# Patient Record
Sex: MALE | Race: WHITE | NOT HISPANIC OR LATINO | Employment: UNEMPLOYED | URBAN - METROPOLITAN AREA
[De-identification: names, ages, dates, MRNs, and addresses within clinical notes are randomized per-mention and may not be internally consistent; named-entity substitution may affect disease eponyms.]

---

## 2018-07-07 ENCOUNTER — HOSPITAL ENCOUNTER (OUTPATIENT)
Facility: MEDICAL CENTER | Age: 28
End: 2018-07-07
Attending: EMERGENCY MEDICINE | Admitting: ORTHOPAEDIC SURGERY
Payer: MEDICAID

## 2018-07-07 ENCOUNTER — APPOINTMENT (OUTPATIENT)
Dept: RADIOLOGY | Facility: MEDICAL CENTER | Age: 28
End: 2018-07-07
Attending: EMERGENCY MEDICINE
Payer: MEDICAID

## 2018-07-07 VITALS
SYSTOLIC BLOOD PRESSURE: 117 MMHG | HEART RATE: 124 BPM | TEMPERATURE: 99.3 F | HEIGHT: 69 IN | WEIGHT: 180 LBS | OXYGEN SATURATION: 94 % | DIASTOLIC BLOOD PRESSURE: 70 MMHG | RESPIRATION RATE: 16 BRPM | BODY MASS INDEX: 26.66 KG/M2

## 2018-07-07 DIAGNOSIS — S52.371A CLOSED GALEAZZI'S FRACTURE OF RIGHT RADIUS, INITIAL ENCOUNTER: ICD-10-CM

## 2018-07-07 PROCEDURE — 73090 X-RAY EXAM OF FOREARM: CPT | Mod: RT

## 2018-07-07 PROCEDURE — 29105 APPLICATION LONG ARM SPLINT: CPT

## 2018-07-07 PROCEDURE — 302875 HCHG BANDAGE ACE 4 OR 6""

## 2018-07-07 PROCEDURE — 302874 HCHG BANDAGE ACE 2 OR 3""

## 2018-07-07 PROCEDURE — 99285 EMERGENCY DEPT VISIT HI MDM: CPT

## 2018-07-07 PROCEDURE — G0378 HOSPITAL OBSERVATION PER HR: HCPCS

## 2018-07-07 RX ORDER — OXYCODONE HYDROCHLORIDE 5 MG/1
10 TABLET ORAL EVERY 4 HOURS PRN
Status: DISCONTINUED | OUTPATIENT
Start: 2018-07-07 | End: 2018-07-08 | Stop reason: HOSPADM

## 2018-07-07 RX ORDER — ACETAMINOPHEN 500 MG
1000 TABLET ORAL EVERY 6 HOURS
Status: DISCONTINUED | OUTPATIENT
Start: 2018-07-08 | End: 2018-07-08 | Stop reason: HOSPADM

## 2018-07-07 RX ORDER — OXYCODONE HYDROCHLORIDE 5 MG/1
5 TABLET ORAL EVERY 4 HOURS PRN
Status: DISCONTINUED | OUTPATIENT
Start: 2018-07-07 | End: 2018-07-08 | Stop reason: HOSPADM

## 2018-07-07 RX ORDER — MORPHINE SULFATE 4 MG/ML
4 INJECTION, SOLUTION INTRAMUSCULAR; INTRAVENOUS ONCE
Status: DISCONTINUED | OUTPATIENT
Start: 2018-07-07 | End: 2018-07-08 | Stop reason: HOSPADM

## 2018-07-07 RX ORDER — ONDANSETRON 2 MG/ML
4 INJECTION INTRAMUSCULAR; INTRAVENOUS EVERY 4 HOURS PRN
Status: DISCONTINUED | OUTPATIENT
Start: 2018-07-07 | End: 2018-07-08 | Stop reason: HOSPADM

## 2018-07-07 RX ORDER — MORPHINE SULFATE 4 MG/ML
4 INJECTION, SOLUTION INTRAMUSCULAR; INTRAVENOUS
Status: DISCONTINUED | OUTPATIENT
Start: 2018-07-07 | End: 2018-07-08 | Stop reason: HOSPADM

## 2018-07-07 ASSESSMENT — PAIN SCALES - GENERAL: PAINLEVEL_OUTOF10: 4

## 2018-07-08 ENCOUNTER — HOSPITAL ENCOUNTER (INPATIENT)
Facility: MEDICAL CENTER | Age: 28
LOS: 1 days | DRG: 563 | End: 2018-07-08
Attending: EMERGENCY MEDICINE | Admitting: INTERNAL MEDICINE
Payer: MEDICAID

## 2018-07-08 DIAGNOSIS — S52.371A CLOSED GALEAZZI'S FRACTURE OF RIGHT RADIUS, INITIAL ENCOUNTER: ICD-10-CM

## 2018-07-08 DIAGNOSIS — F15.10 METHAMPHETAMINE USE (HCC): ICD-10-CM

## 2018-07-08 LAB
AMPHET UR QL SCN: POSITIVE
ANION GAP SERPL CALC-SCNC: 10 MMOL/L (ref 0–11.9)
APTT PPP: 24.3 SEC (ref 24.7–36)
BARBITURATES UR QL SCN: NEGATIVE
BASOPHILS # BLD AUTO: 0.3 % (ref 0–1.8)
BASOPHILS # BLD: 0.03 K/UL (ref 0–0.12)
BENZODIAZ UR QL SCN: NEGATIVE
BUN SERPL-MCNC: 15 MG/DL (ref 8–22)
BZE UR QL SCN: NEGATIVE
CALCIUM SERPL-MCNC: 9.9 MG/DL (ref 8.5–10.5)
CANNABINOIDS UR QL SCN: POSITIVE
CHLORIDE SERPL-SCNC: 107 MMOL/L (ref 96–112)
CO2 SERPL-SCNC: 25 MMOL/L (ref 20–33)
CREAT SERPL-MCNC: 0.89 MG/DL (ref 0.5–1.4)
EOSINOPHIL # BLD AUTO: 0.04 K/UL (ref 0–0.51)
EOSINOPHIL NFR BLD: 0.3 % (ref 0–6.9)
ERYTHROCYTE [DISTWIDTH] IN BLOOD BY AUTOMATED COUNT: 44.6 FL (ref 35.9–50)
GLUCOSE SERPL-MCNC: 124 MG/DL (ref 65–99)
HCT VFR BLD AUTO: 44.4 % (ref 42–52)
HGB BLD-MCNC: 15.5 G/DL (ref 14–18)
IMM GRANULOCYTES # BLD AUTO: 0.03 K/UL (ref 0–0.11)
IMM GRANULOCYTES NFR BLD AUTO: 0.3 % (ref 0–0.9)
INR PPP: 1.01 (ref 0.87–1.13)
LYMPHOCYTES # BLD AUTO: 1.73 K/UL (ref 1–4.8)
LYMPHOCYTES NFR BLD: 14.6 % (ref 22–41)
MCH RBC QN AUTO: 32.5 PG (ref 27–33)
MCHC RBC AUTO-ENTMCNC: 34.9 G/DL (ref 33.7–35.3)
MCV RBC AUTO: 93.1 FL (ref 81.4–97.8)
METHADONE UR QL SCN: NEGATIVE
MONOCYTES # BLD AUTO: 0.79 K/UL (ref 0–0.85)
MONOCYTES NFR BLD AUTO: 6.7 % (ref 0–13.4)
NEUTROPHILS # BLD AUTO: 9.23 K/UL (ref 1.82–7.42)
NEUTROPHILS NFR BLD: 77.8 % (ref 44–72)
NRBC # BLD AUTO: 0 K/UL
NRBC BLD-RTO: 0 /100 WBC
OPIATES UR QL SCN: NEGATIVE
OXYCODONE UR QL SCN: NEGATIVE
PCP UR QL SCN: NEGATIVE
PLATELET # BLD AUTO: 259 K/UL (ref 164–446)
PMV BLD AUTO: 9.7 FL (ref 9–12.9)
POTASSIUM SERPL-SCNC: 3.8 MMOL/L (ref 3.6–5.5)
PROPOXYPH UR QL SCN: NEGATIVE
PROTHROMBIN TIME: 13 SEC (ref 12–14.6)
RBC # BLD AUTO: 4.77 M/UL (ref 4.7–6.1)
SODIUM SERPL-SCNC: 142 MMOL/L (ref 135–145)
WBC # BLD AUTO: 11.9 K/UL (ref 4.8–10.8)

## 2018-07-08 PROCEDURE — 99285 EMERGENCY DEPT VISIT HI MDM: CPT

## 2018-07-08 PROCEDURE — 80307 DRUG TEST PRSMV CHEM ANLYZR: CPT

## 2018-07-08 PROCEDURE — 85610 PROTHROMBIN TIME: CPT

## 2018-07-08 PROCEDURE — 80048 BASIC METABOLIC PNL TOTAL CA: CPT

## 2018-07-08 PROCEDURE — 770006 HCHG ROOM/CARE - MED/SURG/GYN SEMI*

## 2018-07-08 PROCEDURE — 85025 COMPLETE CBC W/AUTO DIFF WBC: CPT

## 2018-07-08 PROCEDURE — 85730 THROMBOPLASTIN TIME PARTIAL: CPT

## 2018-07-08 RX ORDER — POLYETHYLENE GLYCOL 3350 17 G/17G
1 POWDER, FOR SOLUTION ORAL
Status: DISCONTINUED | OUTPATIENT
Start: 2018-07-08 | End: 2018-07-09 | Stop reason: HOSPADM

## 2018-07-08 RX ORDER — AMOXICILLIN 250 MG
2 CAPSULE ORAL 2 TIMES DAILY
Status: DISCONTINUED | OUTPATIENT
Start: 2018-07-08 | End: 2018-07-09 | Stop reason: HOSPADM

## 2018-07-08 RX ORDER — SODIUM CHLORIDE 9 MG/ML
INJECTION, SOLUTION INTRAVENOUS CONTINUOUS
Status: DISCONTINUED | OUTPATIENT
Start: 2018-07-08 | End: 2018-07-09 | Stop reason: HOSPADM

## 2018-07-08 RX ORDER — ACETAMINOPHEN 325 MG/1
650 TABLET ORAL EVERY 6 HOURS PRN
Status: DISCONTINUED | OUTPATIENT
Start: 2018-07-08 | End: 2018-07-09 | Stop reason: HOSPADM

## 2018-07-08 RX ORDER — BISACODYL 10 MG
10 SUPPOSITORY, RECTAL RECTAL
Status: DISCONTINUED | OUTPATIENT
Start: 2018-07-08 | End: 2018-07-09 | Stop reason: HOSPADM

## 2018-07-08 ASSESSMENT — LIFESTYLE VARIABLES: DO YOU DRINK ALCOHOL: NO

## 2018-07-08 NOTE — PROGRESS NOTES
Right radius Galeazzi fracture.    Plan:  - Admit to observation  - Sugar tong splint in ER tonight  - To OR tomorrow for ORIF  - npo after midnight

## 2018-07-08 NOTE — ED NOTES
Pt took off down the breen out of the ER, walking briskly, not responding to name being called. Called his name firmly and eventually responded. Pt had patent IV in his arm.
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
## 2018-07-08 NOTE — DISCHARGE INSTRUCTIONS
You have a very serious break and dislocation to your right arm.  It requires surgery.  If you change your mind about admission to the hospital for surgery please feel free to return at any time    Extremity Fracture  Broken bones (fractures) take several weeks to months to heal depending on the bone involved. The broken ends must be lined up correctly and kept in position for proper healing. Do not remove the splint, immobilizer, or cast that has been applied to treat your injury. This is the most important part of your treatment. Other measures to treat fractures include:  · Keeping the injured limb at rest and elevated above your heart as recommended by your caregiver. This will help reduce pain and swelling.   · Ice packs can be applied to your fracture site for 20-30 minutes every 3-4 hours over the next 2-3 days.   · Pain medicine may be prescribed in the first days after a fracture.   SEEK IMMEDIATE MEDICAL CARE IF:  · You develop increasing pain or pressure in the injured limb, or if it becomes cold, numb, or pale.   · There is increasing pain with motion of your fingers or toes.   Document Released: 01/25/2006 Document Revised: 03/11/2013 Document Reviewed: 04/07/2010  [employer]® Patient Information ©2013 [employer].

## 2018-07-08 NOTE — ED NOTES
ERP discussed case with pt, risks & benefits. Pt still wishes to sign out AMA. Splint remains in place.

## 2018-07-09 ENCOUNTER — APPOINTMENT (OUTPATIENT)
Dept: RADIOLOGY | Facility: MEDICAL CENTER | Age: 28
DRG: 512 | End: 2018-07-09
Attending: ORTHOPAEDIC SURGERY
Payer: MEDICAID

## 2018-07-09 ENCOUNTER — HOSPITAL ENCOUNTER (INPATIENT)
Facility: MEDICAL CENTER | Age: 28
LOS: 1 days | DRG: 512 | End: 2018-07-09
Attending: EMERGENCY MEDICINE | Admitting: INTERNAL MEDICINE
Payer: MEDICAID

## 2018-07-09 VITALS
DIASTOLIC BLOOD PRESSURE: 92 MMHG | SYSTOLIC BLOOD PRESSURE: 130 MMHG | TEMPERATURE: 97 F | OXYGEN SATURATION: 96 % | BODY MASS INDEX: 24.56 KG/M2 | HEART RATE: 90 BPM | HEIGHT: 68 IN | RESPIRATION RATE: 17 BRPM | WEIGHT: 162.04 LBS

## 2018-07-09 VITALS
HEART RATE: 96 BPM | RESPIRATION RATE: 18 BRPM | BODY MASS INDEX: 26.57 KG/M2 | DIASTOLIC BLOOD PRESSURE: 75 MMHG | WEIGHT: 179.9 LBS | SYSTOLIC BLOOD PRESSURE: 140 MMHG | OXYGEN SATURATION: 97 % | TEMPERATURE: 98.2 F

## 2018-07-09 DIAGNOSIS — S52.371A CLOSED GALEAZZI'S FRACTURE OF RIGHT RADIUS, INITIAL ENCOUNTER: ICD-10-CM

## 2018-07-09 DIAGNOSIS — G89.18 POSTOPERATIVE PAIN: ICD-10-CM

## 2018-07-09 PROBLEM — F15.10 METHAMPHETAMINE ABUSE (HCC): Status: ACTIVE | Noted: 2018-07-09

## 2018-07-09 PROCEDURE — 160028 HCHG SURGERY MINUTES - 1ST 30 MINS LEVEL 3: Performed by: ORTHOPAEDIC SURGERY

## 2018-07-09 PROCEDURE — 160046 HCHG PACU - 1ST 60 MINS PHASE II: Performed by: ORTHOPAEDIC SURGERY

## 2018-07-09 PROCEDURE — 99285 EMERGENCY DEPT VISIT HI MDM: CPT

## 2018-07-09 PROCEDURE — 160025 RECOVERY II MINUTES (STATS): Performed by: ORTHOPAEDIC SURGERY

## 2018-07-09 PROCEDURE — 36415 COLL VENOUS BLD VENIPUNCTURE: CPT

## 2018-07-09 PROCEDURE — 160002 HCHG RECOVERY MINUTES (STAT): Performed by: ORTHOPAEDIC SURGERY

## 2018-07-09 PROCEDURE — 160039 HCHG SURGERY MINUTES - EA ADDL 1 MIN LEVEL 3: Performed by: ORTHOPAEDIC SURGERY

## 2018-07-09 PROCEDURE — 99223 1ST HOSP IP/OBS HIGH 75: CPT | Performed by: INTERNAL MEDICINE

## 2018-07-09 PROCEDURE — 0PSH04Z REPOSITION RIGHT RADIUS WITH INTERNAL FIXATION DEVICE, OPEN APPROACH: ICD-10-PCS | Performed by: ORTHOPAEDIC SURGERY

## 2018-07-09 PROCEDURE — A9270 NON-COVERED ITEM OR SERVICE: HCPCS

## 2018-07-09 PROCEDURE — 500881 HCHG PACK, EXTREMITY: Performed by: ORTHOPAEDIC SURGERY

## 2018-07-09 PROCEDURE — 160048 HCHG OR STATISTICAL LEVEL 1-5: Performed by: ORTHOPAEDIC SURGERY

## 2018-07-09 PROCEDURE — 501838 HCHG SUTURE GENERAL: Performed by: ORTHOPAEDIC SURGERY

## 2018-07-09 PROCEDURE — 700101 HCHG RX REV CODE 250

## 2018-07-09 PROCEDURE — C1713 ANCHOR/SCREW BN/BN,TIS/BN: HCPCS | Performed by: ORTHOPAEDIC SURGERY

## 2018-07-09 PROCEDURE — 73090 X-RAY EXAM OF FOREARM: CPT | Mod: RT

## 2018-07-09 PROCEDURE — 700111 HCHG RX REV CODE 636 W/ 250 OVERRIDE (IP)

## 2018-07-09 PROCEDURE — 160035 HCHG PACU - 1ST 60 MINS PHASE I: Performed by: ORTHOPAEDIC SURGERY

## 2018-07-09 PROCEDURE — 700102 HCHG RX REV CODE 250 W/ 637 OVERRIDE(OP)

## 2018-07-09 PROCEDURE — 160009 HCHG ANES TIME/MIN: Performed by: ORTHOPAEDIC SURGERY

## 2018-07-09 PROCEDURE — 306637 HCHG MISC ORTHO ITEM RC 0274

## 2018-07-09 DEVICE — SCREW 3.5 MM NON-LOCKING TI X 16MM LONG (6TX8+2TX5=58): Type: IMPLANTABLE DEVICE | Site: ARM | Status: FUNCTIONAL

## 2018-07-09 DEVICE — SCREW 3.5 MM LOCKING TI X 14MM LONG (6TX8+2TX5=58): Type: IMPLANTABLE DEVICE | Site: ARM | Status: FUNCTIONAL

## 2018-07-09 DEVICE — IMPLANTABLE DEVICE: Type: IMPLANTABLE DEVICE | Site: ARM | Status: FUNCTIONAL

## 2018-07-09 DEVICE — SCREW 3.5 MM NON-LOCKING TI X 14MM LONG (6TX8+2TX5=58): Type: IMPLANTABLE DEVICE | Site: ARM | Status: FUNCTIONAL

## 2018-07-09 RX ORDER — AMOXICILLIN 250 MG
2 CAPSULE ORAL 2 TIMES DAILY
Status: DISCONTINUED | OUTPATIENT
Start: 2018-07-09 | End: 2018-07-09 | Stop reason: HOSPADM

## 2018-07-09 RX ORDER — IBUPROFEN 200 MG
400 TABLET ORAL EVERY 6 HOURS PRN
Status: DISCONTINUED | OUTPATIENT
Start: 2018-07-09 | End: 2018-07-09 | Stop reason: HOSPADM

## 2018-07-09 RX ORDER — HALOPERIDOL 5 MG/ML
INJECTION INTRAMUSCULAR
Status: DISCONTINUED
Start: 2018-07-09 | End: 2018-07-09 | Stop reason: HOSPADM

## 2018-07-09 RX ORDER — ACETAMINOPHEN 325 MG/1
650 TABLET ORAL EVERY 6 HOURS PRN
Status: DISCONTINUED | OUTPATIENT
Start: 2018-07-09 | End: 2018-07-09 | Stop reason: HOSPADM

## 2018-07-09 RX ORDER — OXYCODONE HYDROCHLORIDE AND ACETAMINOPHEN 5; 325 MG/1; MG/1
TABLET ORAL
Status: COMPLETED
Start: 2018-07-09 | End: 2018-07-09

## 2018-07-09 RX ORDER — BISACODYL 10 MG
10 SUPPOSITORY, RECTAL RECTAL
Status: DISCONTINUED | OUTPATIENT
Start: 2018-07-09 | End: 2018-07-09 | Stop reason: HOSPADM

## 2018-07-09 RX ORDER — POLYETHYLENE GLYCOL 3350 17 G/17G
1 POWDER, FOR SOLUTION ORAL
Status: DISCONTINUED | OUTPATIENT
Start: 2018-07-09 | End: 2018-07-09 | Stop reason: HOSPADM

## 2018-07-09 RX ORDER — OXYCODONE HYDROCHLORIDE 5 MG/1
5 TABLET ORAL EVERY 4 HOURS PRN
Qty: 20 TAB | Refills: 0 | Status: SHIPPED | OUTPATIENT
Start: 2018-07-09 | End: 2018-07-14

## 2018-07-09 RX ADMIN — HYDROMORPHONE HYDROCHLORIDE 0.5 MG: 10 INJECTION, SOLUTION INTRAMUSCULAR; INTRAVENOUS; SUBCUTANEOUS at 14:45

## 2018-07-09 RX ADMIN — HYDROMORPHONE HYDROCHLORIDE 0.5 MG: 10 INJECTION, SOLUTION INTRAMUSCULAR; INTRAVENOUS; SUBCUTANEOUS at 14:30

## 2018-07-09 RX ADMIN — OXYCODONE AND ACETAMINOPHEN 2 TABLET: 5; 325 TABLET ORAL at 14:31

## 2018-07-09 RX ADMIN — FENTANYL CITRATE 50 MCG: 50 INJECTION, SOLUTION INTRAMUSCULAR; INTRAVENOUS at 14:40

## 2018-07-09 ASSESSMENT — ENCOUNTER SYMPTOMS
FALLS: 0
FEVER: 0
VOMITING: 0
SPEECH CHANGE: 0
SHORTNESS OF BREATH: 0
SENSORY CHANGE: 0
COUGH: 0
BACK PAIN: 0
ABDOMINAL PAIN: 0
VOMITING: 0
HEARTBURN: 0
FOCAL WEAKNESS: 0
LOSS OF CONSCIOUSNESS: 0
ABDOMINAL PAIN: 0
SEIZURES: 0
HEMOPTYSIS: 0
ORTHOPNEA: 0
EYE PAIN: 0
BACK PAIN: 0
HEADACHES: 0
NAUSEA: 0
FEVER: 0
CHILLS: 0
NAUSEA: 0
NECK PAIN: 0
CHILLS: 0
STRIDOR: 0
DIZZINESS: 0

## 2018-07-09 ASSESSMENT — PAIN SCALES - GENERAL
PAINLEVEL_OUTOF10: 7
PAINLEVEL_OUTOF10: 4
PAINLEVEL_OUTOF10: 5
PAINLEVEL_OUTOF10: 4
PAINLEVEL_OUTOF10: 7

## 2018-07-09 ASSESSMENT — LIFESTYLE VARIABLES: SUBSTANCE_ABUSE: 1
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
## 2018-07-09 NOTE — ED PROVIDER NOTES
ED Provider Note    Scribed for Belle Andrews M.D. by Srikanth Raines. 7/8/2018, 6:23 PM.    Primary care provider: Pcp Pt States None  Means of arrival: Walk-In  History obtained from: Patient  History limited by: None    CHIEF COMPLAINT  Chief Complaint   Patient presents with   • Arm Pain       HPI  Bolivar Leos is a 28 y.o. male who presents to the Emergency Department for evaluation of right arm pain secondary to breaking his right arm yesterday. Patient reports his pain level has not reduced since breaking it. Patient endorses drinking throughout the day along with methamphetamine use prior to arrival in the ED. Patient last ate one hour ago. Patient does not report and sensory changes.      REVIEW OF SYSTEMS  Pertinent positives include Arm Pain. Pertinent negatives include no sensory changes. As above, all other systems are negative.  C.    PAST MEDICAL HISTORY   has a past medical history of Asymptomatic human immunodeficiency virus (HIV) infection status (HCC).    SURGICAL HISTORY   has a past surgical history that includes open reduction and ear middle exploration.    SOCIAL HISTORY  Social History   Substance Use Topics   • Smoking status: Former Smoker   • Smokeless tobacco: Former User   • Alcohol use Yes      History   Drug Use   • Types: Methamphetamines       FAMILY HISTORY  No pertinent family history noted.    CURRENT MEDICATIONS  No current facility-administered medications on file prior to encounter.      Current Outpatient Prescriptions on File Prior to Encounter   Medication Sig Dispense Refill   • efavirenz-emtrictabine-tenofovir (ATRIPLA) 600-200-300 MG per tablet Take 1 Tab by mouth every day. 30 Tab 11   • diphenhydrAMINE (BENADRYL) 50 MG tablet Patient to take one at night for itching. 30 Tab 2       ALLERGIES  No Known Allergies     Labs:  Labs Reviewed   CBC WITH DIFFERENTIAL - Abnormal; Notable for the following:        Result Value    WBC 11.9 (*)     Neutrophils-Polys 77.80 (*)      Lymphocytes 14.60 (*)     Neutrophils (Absolute) 9.23 (*)     All other components within normal limits    Narrative:     Indicate which anticoagulants the patient is on:->NONE   BASIC METABOLIC PANEL - Abnormal; Notable for the following:     Glucose 124 (*)     All other components within normal limits    Narrative:     Indicate which anticoagulants the patient is on:->NONE   APTT - Abnormal; Notable for the following:     APTT 24.3 (*)     All other components within normal limits    Narrative:     Indicate which anticoagulants the patient is on:->NONE   PROTHROMBIN TIME    Narrative:     Indicate which anticoagulants the patient is on:->NONE   URINE DRUG SCREEN   ESTIMATED GFR    Narrative:     Indicate which anticoagulants the patient is on:->NONE       PHYSICAL EXAM  VITAL SIGNS: /75   Pulse (!) 125   Temp 36.8 °C (98.2 °F) (Temporal)   Resp 18   Wt 81.6 kg (180 lb)   SpO2 94%   BMI 26.58 kg/m²   ***    Constitutional: Alert in no apparent distress.  HENT: No signs of trauma, Bilateral external ears normal, Nose normal.   Eyes: Pupils are equal and reactive, Conjunctiva normal, Non-icteric.   Neck: Normal range of motion, No tenderness, Supple, No stridor.   Cardiovascular: Regular rate and rhythm, no murmurs.   Thorax & Lungs: Normal breath sounds, No respiratory distress, No wheezing, No chest tenderness.   Abdomen: Bowel sounds normal, Soft, No tenderness, No masses, No peritoneal signs.  Skin: Warm, Dry, No erythema, No rash.   Musculoskeletal: Right arm in sugar tong splint, fingers are neurovascularly intact   Neurologic: Alert, moving all extremities without difficulty, no focal deficits.    COURSE & MEDICAL DECISION MAKING  Pertinent Labs & Imaging studies reviewed. (See chart for details)    Differential diagnoses include but are not limited to: ***    6:23 PM - Patient seen and examined at bedside.     6:36 PM - Paged Orthopedics    6:39 PM - Spoke with Dr. Nunez, Orthopedics, about  patient. He requests patient to be admitted.    6:41 PM - Ordered Urine drug screen, CBC with differential, BMP, Prothrombin time and APTT,    7:08 PM - Patient was reevaluated at bedside who was resting comfortably in bed. Informed he will admitted. Patient understands and agrees.    7:33 PM - Paged [facility]    7:39 PM - I spoke with [facility] , who agrees to admit the patient.      ***    Decision Making:  This is a 28 y.o. year old male who presents with ***    DISPOSITION:  Patient will be admitted to [facility] , in stable condition.    FINAL IMPRESSION  No diagnosis found. ***    This dictation has been created using voice recognition software and/or scribes. The accuracy of the dictation is limited by the abilities of the software and the expertise of the scribes. I expect there may be some errors of grammar and possibly content. I made every attempt to manually correct the errors within my dictation. However, errors related to voice recognition software and/or scribes may still exist and should be interpreted within the appropriate context.     ISrikanth (Scribe), am scribing for, and in the presence of, Belle Andrews M.D..    Electronically signed by: Srikanth Raines ([email or web address]), 7/8/2018    Belle DORMAN M.D. personally performed the services described in this documentation, as scribed by Srikanth Raines in my presence, and it is both accurate and complete.    {ERP Attestation (ERP ONLY):100333}

## 2018-07-09 NOTE — OR NURSING
Patient A+Ox4. States pain tolerble. tino po well.  Friend called to transport patient home.  To stage 2.  Rt arm cast intct and elevate on pillow.  Cms good. Cap refill immediate

## 2018-07-09 NOTE — ASSESSMENT & PLAN NOTE
- HIV+, off medications 2 weeks denies fever, chills, night sweats  - We may consider ID consult  - Patient is advised to follow up out patient with family medicine or ID specialist to restart treatment

## 2018-07-09 NOTE — PROGRESS NOTES
Patient has elected to leave against medical advice. Patient educated on risks associated with leaving. PIV removed.
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
## 2018-07-09 NOTE — ED TRIAGE NOTES
"Bolivar Leos  28 y.o. male  Chief Complaint   Patient presents with   • Follow-Up     Pt. was admitted and AMA'd yesterday due to being placed in a double room and not being able to have his girlfriend stay prior to having surgery on his right ulnar and radius fracture per chart review. Pt. re educated that it is very likely if he is admitted again today for surgery that he will again be placed into a double room and that his girlfriend will not be able to stay again.   • Arm Injury     Pt. reports no new or worsening s/s just that he needs the surgery and pain management of his right arm. Pulses palpable. CMS intact.     /93   Pulse 93   Temp 36.8 °C (98.3 °F)   Resp 16   Ht 1.727 m (5' 8\")   Wt 73.5 kg (162 lb 0.6 oz)   SpO2 99%   BMI 24.64 kg/m²     Pt amb to triage with steady gait for above complaint. Splint in place over right arm.  Pt is alert and oriented, speaking in full sentences, follows commands and responds appropriately to questions. NAD. Resp are even and unlabored.  Pt placed in lobby. Pt educated on triage process. Pt encouraged to alert staff for any changes.  "

## 2018-07-09 NOTE — ASSESSMENT & PLAN NOTE
- Patient states that often uses drugs because of environment that surrounds him  - I went over the negative side effects of methamphetamine abuse  - I will consult social work to research resources that might help him to quit
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
## 2018-07-09 NOTE — ED TRIAGE NOTES
Pt seen here yesterday for RUE fracture. Pt needed surgery but left AMA to find his girlfriend. Pt did drink alcohol today and used meth.

## 2018-07-09 NOTE — PROGRESS NOTES
Patient arrived to unit via patient transport. Alert and oriented x4, and able to easily ambulate from gurney to bed. Patient's girlfriend is present, and patient is very concerned about where his girlfriend will stay tonight. The couple is homeless, and the patient is refusing to stay if his girlfriend can not stay the night with him. No private rooms are available. Patient is being educated on the risks of leaving AMA and not getting the scheduled surgery. Patient and his girlfriend are currently discussing whether he will stay or not.
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
## 2018-07-09 NOTE — ED PROVIDER NOTES
ED Provider Note    Scribed for Belle Andrews M.D. by Srikanth Raines. 7/8/2018, 6:23 PM.    Primary care provider: Pcp Pt States None  Means of arrival: Walk-In  History obtained from: Patient  History limited by: None    CHIEF COMPLAINT  Chief Complaint   Patient presents with   • Arm Pain       HPI  Bolivar Leos is a 28 y.o. male who presents to the Emergency Department for evaluation of right arm pain secondary to breaking his right arm yesterday. Patient reports his pain level has not reduced since breaking it. Patient endorses drinking throughout the day along with methamphetamine use prior to arrival in the ED. Patient last ate one hour ago. Patient does not report and sensory changes.      REVIEW OF SYSTEMS  Pertinent positives include Arm Pain. Pertinent negatives include no sensory changes. As above, all other systems are negative.  C.    PAST MEDICAL HISTORY   has a past medical history of Asymptomatic human immunodeficiency virus (HIV) infection status (HCC).    SURGICAL HISTORY   has a past surgical history that includes open reduction and ear middle exploration.    SOCIAL HISTORY  Social History   Substance Use Topics   • Smoking status: Former Smoker   • Smokeless tobacco: Former User   • Alcohol use Yes      History   Drug Use   • Types: Methamphetamines       FAMILY HISTORY  No pertinent family history noted.    CURRENT MEDICATIONS  No current facility-administered medications on file prior to encounter.      No current outpatient prescriptions on file prior to encounter.       ALLERGIES  No Known Allergies     Labs:  Labs Reviewed   CBC WITH DIFFERENTIAL - Abnormal; Notable for the following:        Result Value    WBC 11.9 (*)     Neutrophils-Polys 77.80 (*)     Lymphocytes 14.60 (*)     Neutrophils (Absolute) 9.23 (*)     All other components within normal limits    Narrative:     Indicate which anticoagulants the patient is on:->NONE   BASIC METABOLIC PANEL - Abnormal; Notable for the following:      Glucose 124 (*)     All other components within normal limits    Narrative:     Indicate which anticoagulants the patient is on:->NONE   APTT - Abnormal; Notable for the following:     APTT 24.3 (*)     All other components within normal limits    Narrative:     Indicate which anticoagulants the patient is on:->NONE   URINE DRUG SCREEN - Abnormal; Notable for the following:     Amphetamines Urine Positive (*)     Cannabinoid Metab Positive (*)     All other components within normal limits   PROTHROMBIN TIME    Narrative:     Indicate which anticoagulants the patient is on:->NONE   ESTIMATED GFR    Narrative:     Indicate which anticoagulants the patient is on:->NONE   CBC WITHOUT DIFFERENTIAL   COMP METABOLIC PANEL         PHYSICAL EXAM  VITAL SIGNS: /75   Pulse (!) 125   Temp 36.8 °C (98.2 °F) (Temporal)   Resp 18   Wt 81.6 kg (180 lb)   SpO2 94%   BMI 26.58 kg/m²  Constitutional: Alert in no apparent distress.  HENT: No signs of trauma, Bilateral external ears normal, Nose normal.   Eyes: Pupils are equal and reactive, Conjunctiva normal, Non-icteric.   Neck: Normal range of motion, No tenderness, Supple, No stridor.   Cardiovascular: Regular rate and rhythm, no murmurs.   Thorax & Lungs: Normal breath sounds, No respiratory distress, No wheezing, No chest tenderness.   Abdomen: Bowel sounds normal, Soft, No tenderness, No masses, No peritoneal signs.  Skin: Warm, Dry, No erythema, No rash.   Musculoskeletal: Right arm in sugar tong splint, fingers are neurovascularly intact   Neurologic: Alert, moving all extremities without difficulty, no focal deficits.    COURSE & MEDICAL DECISION MAKING  Pertinent Labs & Imaging studies reviewed. (See chart for details)    6:23 PM - Patient seen and examined at bedside.     6:36 PM - Paged Orthopedics    6:39 PM - Spoke with Orthopedics about patient. He requests patient to be admitted.    6:41 PM - Ordered Urine drug screen, CBC with differential, BMP,  Prothrombin time and APTT,    7:08 PM - Patient was reevaluated at bedside who was resting comfortably in bed. Informed he will admitted. Patient understands and agrees.      DISPOSITION:  Patient will be admitted to [facility] internal in stable condition.    FINAL IMPRESSION  1. Closed Galeazzi's fracture of right radius, initial encounter    2. Methamphetamine use           This dictation has been created using voice recognition software and/or scribes. The accuracy of the dictation is limited by the abilities of the software and the expertise of the scribes. I expect there may be some errors of grammar and possibly content. I made every attempt to manually correct the errors within my dictation. However, errors related to voice recognition software and/or scribes may still exist and should be interpreted within the appropriate context.     ISrikanth (Scribe), am scribing for, and in the presence of, Belle Andrews M.D..    Electronically signed by: Srikanth Raines (Scribe), 7/8/2018    IBelle M.D. personally performed the services described in this documentation, as scribed by Srikanth Raines in my presence, and it is both accurate and complete.    The note accurately reflects work and decisions made by me.  Belle Andrews  7/9/2018  12:13 AM

## 2018-07-09 NOTE — ED NOTES
KINDER LOW    KINDER FALL RISK   RISK   Present to ED b/c of fall (syncope, seizure, or ALOC)? NO  Age >70? NO  Altered Mental Status (Intoxicated with alcohol or substance confusion, inability to follow instructions, disorientation)? NO  Impaired Mobility (Ambulates or transfers with assistive devices or assist. Ambulates with unsteady gait and no assistance. Unable to ambulate to transfer.)? NO  Nursing Judgement (Bowel or bladder incontinence, diarrhea, urinary frequency or urgency, leg weakness, orthostatic hypotension, dizziness or vertigo, narcotic use.)? NO    YES TO ANY RISK = HIGH FALL RISK   1. Move patient closer to nurses stations   2. Familiarize the patient with environment   3. Place call light within reach and demonstrate call light use   4. Keep patients personal possessions within patient safe reach (if appropriate)   5. Place stretcher in low position and brakes locked   6.Place yellow socks and armband on patient   7. Place green triangle on patients door   8. Give patient urinal if applicable   9. Keep floor surfaces clean and dry   10.Keep patient care areas uncluttered   11. Use a lap belt or posey vest   12. Assess patient hourly for :Pain, persona needs, position change, and call light access.

## 2018-07-09 NOTE — ED PROVIDER NOTES
"ED Provider Note      CHIEF COMPLAINT   Arm pain  \"I am ready for surgery\"    HPI   Bolivar Leos is a 28 y.o. male who presents to the emergency department with right arm pain.  Patient has a known radius fracture that required surgery.  He originally was seen in the ER and left AMA to follow-up on an outpatient basis.  He returned to the ER and was admitted and seen by orthopedics.  Plan was for an operation today, but yesterday he left.  Said he was just too nervous to stay in the hospital.  He also did not care for being in a double room.  He states that now he is ready to stay and is willing to do what it takes to get his arm fixed.  He has persistent throbbing nonradiating pain over the arm.  This is unchanged.  No neurologic symptoms.  He ate a bag of chips and had a soda just before coming in.    REVIEW OF SYSTEMS   As above  PAST MEDICAL HISTORY   Past Medical History:   Diagnosis Date   • Asymptomatic human immunodeficiency virus (HIV) infection status (HCC)        SOCIAL HISTORY  Social History   Substance Use Topics   • Smoking status: Current Every Day Smoker     Packs/day: 1.00     Years: 16.00   • Smokeless tobacco: Former User   • Alcohol use Yes      Comment: Ocassional       ALLERGIES   See chart    PHYSICAL EXAM  VITAL SIGNS: /93   Pulse 93   Temp 36.8 °C (98.3 °F)   Resp 16   Ht 1.727 m (5' 8\")   Wt 73.5 kg (162 lb 0.6 oz)   SpO2 99%   BMI 24.64 kg/m²   Head: Atraumatic  Eyes: Eyes normal inspection  Neck: has full range of motion, normal inspection.  Constitutional: No acute distress   Cardiovascular: Normal heart rate.  Good capillary refill distally  Thorax & Lungs: No respiratory distress    Musculoskeletal: There is a splint on his right upper arm from previous visit.  Neurologic:  Normal sensory and motor    RADIOLOGY/PROCEDURES  X-ray reviewed demonstrating displaced midshaft radius fracture.    COURSE & MEDICAL DECISION MAKING  Patient with right radius fracture.  I spoke with " orthopedics.  Patient still on the schedule for surgery today.  Patient will be admitted to the medicine team.    FINAL IMPRESSION  1.  Radius fracture      This dictation was created using voice recognition software. The accuracy of the dictation is limited to the abilities of the software. I expect there may be some errors of grammar and possibly content. The nursing notes were reviewed and certain aspects of this information were incorporated into this note.      Electronically signed by: Lisandro Sinha, 7/9/2018 6:11 AM

## 2018-07-09 NOTE — PROGRESS NOTES
Received a paged that patient left his room in [id number]  At the time we were paged patient was no longer in the hospital.

## 2018-07-09 NOTE — ED NOTES
Med Rec complete per Pt at bedside  Allergies Reviewed  No ABX in the last 30 days  Ok per Pt to discuss medications with visitor/s present

## 2018-07-09 NOTE — ED NOTES
Assumed care of pt, report received. Pt quietly asleep on gurney. S/O at bedside. Updated on admission order. Educated that pt to remain NPO. Admitting at bedside.

## 2018-07-09 NOTE — PROGRESS NOTES
Charge RN Notes:    It was brought to my attention that patient wanted to leave AMA again. Spoke with the patient and told him the consequences of leaving AMA. He is aware that the surgery will be cancelled and will have to go to ED again if he wants to pursue with surgery. Offered the [facility] accomodations like [facility] [employer] or discounted hotel rates, but they refused.     Primary RN updated.

## 2018-07-09 NOTE — ASSESSMENT & PLAN NOTE
- X-ray shows displaced fracture of the distal radial diaphysis of R forearm  - no weakness, normal capillary refill, pain 0-3  - ortho was consulted and plans surgery for today  - tylenol PRN for pain

## 2018-07-10 NOTE — DISCHARGE INSTRUCTIONS
Open Reduction, Internal Fixation (ORIF), Generic  Usually, if bones are broken (fractured) and are out of place, unstable, or may become out of place, surgery is needed. This surgery is called an open reduction and internal fixation (ORIF). Open reduction means that the area of the fracture is opened up so the surgeon can see it. Internal fixation means that screws, pins, or fixation devices are used to hold the bone pieces in place.  LET YOUR CAREGIVER KNOW ABOUT:   · Allergies.  · Medicines taken, including herbs, eyedrops, over-the-counter medicines, and creams.  · Use of steroids (by mouth or creams).  · Previous problems with anesthetics or numbing medicines.  · History of bleeding or blood problems.  · History of blood clots.  · Possibility of pregnancy, if this applies.  · Previous surgery.  · Other health problems.  RISKS AND COMPLICATIONS   All surgery is associated with risks. Some of these risks are:  · Excessive bleeding.  · Infection.  · Imperfect results with loss of joint function.  BEFORE THE PROCEDURE   Usually, surgery is performed shortly after the injury. It is important to provide information to your caregiver after your injury.  AFTER THE PROCEDURE   After surgery, you will be taken to a recovery area where a nurse will monitor your progress. You may have a long, narrow tube(catheter) in the bladder following surgery that helps you pass your water. When awake, stable, taking fluids well, and without complications, you will be returned to your room. You will receive physical therapy and other care. Physical therapy is done until you are doing well and your caregiver feels it is safe for you to go home or to an extended care facility.  Following surgery, the bones may be protected with a cast. The type of casting depends on where the fracture was. Casts are generally left in place for about 5 to 6 weeks. During this time, your caregiver will follow your progress. X-rays may be taken during  healing to make sure the bones stay in place.  HOME CARE INSTRUCTIONS   · You or your child may resume normal diet and activities as directed or allowed.  · Put ice on the injured area.  · Put ice in a plastic bag.  · Place a towel between the skin and the bag.  · Leave the ice on for 15-20 minutes at a time, 3-4 times a day, for the first 2 days following surgery.  · Change bandages (dressings) if necessary or as directed.  · If given a plaster or fiberglass cast:  · Do not try to scratch the skin under the cast using sharp or pointed objects.  · Check the skin around the cast every day. You may put lotion on any red or sore areas.  · Keep the cast dry and clean.  · Do not put pressure on any part of the cast or splint until it is fully hardened.  · The cast or splint can be protected during bathing with a plastic bag. Do not lower the cast or splint into water.  · Only take over-the-counter or prescription medicines for pain, discomfort, or fever as directed by your caregiver.  · Use crutches as directed and do not exercise the leg unless instructed.  · If the bones get out of position (displaced), it may eventually lead to arthritis and lasting disability. Problems can follow even the best of care. Follow the directions of your caregiver.  · Follow all instructions given by your caregiver, make and keep follow-up appointments, and use crutches as directed.  SEEK IMMEDIATE MEDICAL CARE IF:   · There is redness, swelling, numbness, or increasing pain in the wound.  · There is pus coming from the wound.  · You or your child has an oral temperature above 102° F (38.9° C), not controlled by medicine.  · A bad smell is coming from the wound or dressing.  · The wound breaks open (edges not staying together) after stitches (sutures) or staples have been removed.  · The skin or nails below the injury turn blue or gray, or feel cold or numb.  · There is severe pain under the cast or in the foot.  If there is not a window  in the cast for observing the wound, a discharge or minor bleeding may show up as a stain on the outside of the cast. Report these findings to your caregiver.  MAKE SURE YOU:   · Understand these instructions.  · Will watch your condition.  · Will get help right away if you are not doing well or gets worse.  Document Released: 12/29/2007 Document Revised: 03/11/2013 Document Reviewed: 12/05/2008  Exit[employer]® Patient Information ©2014 [employer].    ACTIVITY: Rest and take it easy for the first 24 hours.  A responsible adult is recommended to remain with you during that time.  It is normal to feel sleepy.  We encourage you to not do anything that requires balance, judgment or coordination.    MILD FLU-LIKE SYMPTOMS ARE NORMAL. YOU MAY EXPERIENCE GENERALIZED MUSCLE ACHES, THROAT IRRITATION, HEADACHE AND/OR SOME NAUSEA.    FOR 24 HOURS DO NOT:  Drive, operate machinery or run household appliances.  Drink beer or alcoholic beverages.   Make important decisions or sign legal documents.    SPECIAL INSTRUCTIONS:      DIET: To avoid nausea, slowly advance diet as tolerated, avoiding spicy or greasy foods for the first day.  Add more substantial food to your diet according to your physician's instructions.  Babies can be fed formula or breast milk as soon as they are hungry.  INCREASE FLUIDS AND FIBER TO AVOID CONSTIPATION.    SURGICAL DRESSING/BATHING: KEEP DRESSING CLEAN AND DRY. USE SPLINT FOR COMFORT    FOLLOW-UP APPOINTMENT:  A follow-up appointment should be arranged with your doctor in 1-2 weeks; call to schedule.    You should CALL YOUR PHYSICIAN if you develop:  Fever greater than 101 degrees F.  Pain not relieved by medication, or persistent nausea or vomiting.  Excessive bleeding (blood soaking through dressing) or unexpected drainage from the wound.  Extreme redness or swelling around the incision site, drainage of pus or foul smelling drainage.  Inability to urinate or empty your bladder within 8  hours.  Problems with breathing or chest pain.    You should call 911 if you develop problems with breathing or chest pain.  If you are unable to contact your doctor or surgical center, you should go to the nearest emergency room or urgent care center.  Physician's telephone #: DR LIN  [phone]    If any questions arise, call your doctor.  If your doctor is not available, please feel free to call the Surgical Center at [phone].  The Center is open Monday through Friday from 7AM to 7PM.  You can also call the [employer] HOTLINE open 24 hours/day, 7 days/week and speak to a nurse at [phone], or toll free at [phone].    A registered nurse may call you a few days after your surgery to see how you are doing after your procedure.    MEDICATIONS: Resume taking daily medication.  Take prescribed pain medication with food.  If no medication is prescribed, you may take non-aspirin pain medication if needed.  PAIN MEDICATION CAN BE VERY CONSTIPATING.  Take a stool softener or laxative such as senokot, pericolace, or milk of magnesia if needed.    Prescription given for HYDROCODONE.  Last pain medication given at 2;30    If your physician has prescribed pain medication that includes Acetaminophen (Tylenol), do not take additional Acetaminophen (Tylenol) while taking the prescribed medication.    Depression / Suicide Risk    As you are discharged from this [facility] facility, it is important to learn how to keep safe from harming yourself.    Recognize the warning signs:  · Abrupt changes in personality, positive or negative- including increase in energy   · Giving away possessions  · Change in eating patterns- significant weight changes-  positive or negative  · Change in sleeping patterns- unable to sleep or sleeping all the time   · Unwillingness or inability to communicate  · Depression  · Unusual sadness, discouragement and loneliness  · Talk of wanting to die  · Neglect of personal  appearance   · Rebelliousness- reckless behavior  · Withdrawal from people/activities they love  · Confusion- inability to concentrate     If you or a loved one observes any of these behaviors or has concerns about self-harm, here's what you can do:  · Talk about it- your feelings and reasons for harming yourself  · Remove any means that you might use to hurt yourself (examples: pills, rope, extension cords, firearm)  · Get professional help from the community (Mental Health, Substance Abuse, psychological counseling)  · Do not be alone:Call your Safe Contact- someone whom you trust who will be there for you.  · Call your local CRISIS HOTLINE 653-3724 or 605-904-2546  · Call your local Children's Mobile Crisis Response Team [place] (044) 500-1284 or www.[employer]  · Call the toll free National Suicide Prevention Hotlines   · National Suicide Prevention Lifeline 780-511-IYXZ (8055)  National [employer] Network 800-SUICIDE (590-9069)  ACTIVITY: Rest and take it easy for the first 24 hours.  A responsible adult is recommended to remain with you during that time.  It is normal to feel sleepy.  We encourage you to not do anything that requires balance, judgment or coordination.    MILD FLU-LIKE SYMPTOMS ARE NORMAL. YOU MAY EXPERIENCE GENERALIZED MUSCLE ACHES, THROAT IRRITATION, HEADACHE AND/OR SOME NAUSEA.    FOR 24 HOURS DO NOT:  Drive, operate machinery or run household appliances.  Drink beer or alcoholic beverages.   Make important decisions or sign legal documents.    SPECIAL INSTRUCTIONS: ***    DIET: To avoid nausea, slowly advance diet as tolerated, avoiding spicy or greasy foods for the first day.  Add more substantial food to your diet according to your physician's instructions.  Babies can be fed formula or breast milk as soon as they are hungry.  INCREASE FLUIDS AND FIBER TO AVOID CONSTIPATION.    SURGICAL DRESSING/BATHING: ***    FOLLOW-UP APPOINTMENT:  A follow-up appointment should be arranged  with your doctor in ***; call to schedule.    You should CALL YOUR PHYSICIAN if you develop:  Fever greater than 101 degrees F.  Pain not relieved by medication, or persistent nausea or vomiting.  Excessive bleeding (blood soaking through dressing) or unexpected drainage from the wound.  Extreme redness or swelling around the incision site, drainage of pus or foul smelling drainage.  Inability to urinate or empty your bladder within 8 hours.  Problems with breathing or chest pain.    You should call 911 if you develop problems with breathing or chest pain.  If you are unable to contact your doctor or surgical center, you should go to the nearest emergency room or urgent care center.  Physician's telephone #: ***    If any questions arise, call your doctor.  If your doctor is not available, please feel free to call the Surgical Center at {Surgical Dept Numbers:[id number]}.  The Center is open Monday through Friday from 7AM to 7PM.  You can also call the HEALTH HOTLINE open 24 hours/day, 7 days/week and speak to a nurse at [phone], or toll free at [phone].    A registered nurse may call you a few days after your surgery to see how you are doing after your procedure.    MEDICATIONS: Resume taking daily medication.  Take prescribed pain medication with food.  If no medication is prescribed, you may take non-aspirin pain medication if needed.  PAIN MEDICATION CAN BE VERY CONSTIPATING.  Take a stool softener or laxative such as senokot, pericolace, or milk of magnesia if needed.    Prescription given for ***.  Last pain medication given at ***.    If your physician has prescribed pain medication that includes Acetaminophen (Tylenol), do not take additional Acetaminophen (Tylenol) while taking the prescribed medication.    Depression / Suicide Risk    As you are discharged from this [facility] facility, it is important to learn how to keep safe from harming yourself.    Recognize the warning signs:  · Abrupt  changes in personality, positive or negative- including increase in energy   · Giving away possessions  · Change in eating patterns- significant weight changes-  positive or negative  · Change in sleeping patterns- unable to sleep or sleeping all the time   · Unwillingness or inability to communicate  · Depression  · Unusual sadness, discouragement and loneliness  · Talk of wanting to die  · Neglect of personal appearance   · Rebelliousness- reckless behavior  · Withdrawal from people/activities they love  · Confusion- inability to concentrate     If you or a loved one observes any of these behaviors or has concerns about self-harm, here's what you can do:  · Talk about it- your feelings and reasons for harming yourself  · Remove any means that you might use to hurt yourself (examples: pills, rope, extension cords, firearm)  · Get professional help from the community (Mental Health, Substance Abuse, psychological counseling)  · Do not be alone:Call your Safe Contact- someone whom you trust who will be there for you.  · Call your local CRISIS HOTLINE 780-1657 or 102-784-4522  · Call your local Children's Mobile Crisis Response Team [place] (478) 001-1706 or www.[employer]  · Call the toll free National Suicide Prevention Hotlines   · National Suicide Prevention Lifeline 393-330-LCQW (6927)  · National Hope Line Network 800-SUICIDE (760-0113)

## 2018-07-10 NOTE — DISCHARGE SUMMARY
Internal Medicine Discharge Summary  Note Author: Reena Oseguera M.D.       Name Bolivar Leos 1990   Age/Sex 28 y.o. male   MRN [id number]         Admit Date:  2018       Discharge Date:  Left AMA on  2018    Service:   [facility] Internal [facility] Gray Team  Attending Physician(s):   Dr Hanna       Senior Resident(s):   Dr Oseguera  Bernardino Resident(s):   Dr Reid  PCP: Pcp Pt States None    Primary Diagnosis:   Right distal radius fracture    Secondary Diagnoses:            HIV      Polysubstance abuse    Hospital Summary (Brief Narrative):       Patient was admitted for right distal radial fracture. He got surgery the same day of admission. Surgery was done by Dr Tyson. Note by OR nurse at 3.19 PM states that patient was waiting for ride and note from 4.10 PM states that patient had left the room without informing. Primary team was not informed about the events.    Patient /Hospital Summary (Details -- Problem Oriented) :          R distal radial fracture: as stated above    HIV: diagnosed in . He was At[facility] until 2 weeks ago. He forgot to fill the prescriptions before leaving [place]. Dr Hollis was contacted to help him get established with [facility] however he left AM before he could be informed that he will be expecting a call from [facility] for appointment.     Polysubstance abuse: He abuses meth, marijuana and cocaine.    Consultants:     None    Procedures:        Surgery for radial fracture on 2018 by Dr Batres    Imaging/ Testing:      DX-PORTABLE FLUORO > 1 HOUR   Final Result         Portable fluoroscopy utilized for 4 seconds.         DX-FOREARM RIGHT   Final Result      Portable intraoperative imaging with findings as described above.        Discharge Medications:         Medication Reconciliation: NA    Disposition:   NA  Diet:   NA  Activity:   NA  Instructions:  NA   Primary Care Provider:    Pcp Pt States None  Discharge summary faxed to primary care provider:   Deferred  Copy of discharge summary given to the patient: Deferred    Follow up appointment details :    NA  Pending Studies:  none    Time spent on discharge day patient visit, preparing discharge paperwork and arranging for patient follow up.    Summary of follow up issues: NA    Discharge Time (Minutes) :    35min  Hospital Course Type:  Inpatient Stay >2 midnights but left AMA earlier.
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
## 2018-07-11 NOTE — OP REPORT
DATE OF SERVICE:  07/09/2018    PREOPERATIVE DIAGNOSIS:  Right radial shaft fracture.    POSTOPERATIVE DIAGNOSIS:  Right radial shaft fracture.    PROCEDURE PERFORMED:  Open reduction and internal fixation, right radial shaft   fracture.    SURGEON:  Michael Lin MD    ASSISTANT:  David Carter PA-C    ESTIMATED BLOOD LOSS:  Minimal.    INDICATIONS:  This is a 28-year-old male who sustained a displaced right   radial shaft fracture.  Risks and benefits of open reduction internal fixation   were discussed at length, which include but not limited to bleeding,   infection, neurovascular damage, pain, stiffness, malunion, nonunion, DVT, PE,   MI, stroke, and death.  He understands all these risks and wished to proceed.    DESCRIPTION OF PROCEDURE:  The patient was sedated with LMA anesthesia and   administered preoperative antibiotics.  His right upper extremity was prepped   and draped in the usual sterile fashion.  A standard dorsal approach to the   radial shaft was performed with care taken to avoid all neurovascular   structures.  The fracture reduced in anatomic position and held with [facility] small   fragment plate with a combination of locking and nonlocking fixation.  All   screws were checked and found to be of appropriate length.  Joint reduction   was found to be anatomic.  Wounds were irrigated, closed with 2-0 Vicryl   suture and staples.  Sterile dressing was applied.  Volar splint was applied.    The patient tolerated the procedure well.    POSTOPERATIVE PLAN:  The patient to be weightbearing as tolerated, admitted by   the trauma service for preoperative antibiotics and pain control.       ____________________________________     MICHAEL LIN MD    RASHI / NTS    DD:  07/11/2018 16:26:02  DT:  07/11/2018 16:41:51    D#:  [id number]  Job#:  [id number]

## 2018-07-18 ENCOUNTER — HOSPITAL ENCOUNTER (EMERGENCY)
Facility: MEDICAL CENTER | Age: 28
End: 2018-07-18
Attending: EMERGENCY MEDICINE
Payer: MEDICAID

## 2018-07-18 VITALS
TEMPERATURE: 98.5 F | HEIGHT: 68 IN | OXYGEN SATURATION: 98 % | SYSTOLIC BLOOD PRESSURE: 122 MMHG | DIASTOLIC BLOOD PRESSURE: 70 MMHG | WEIGHT: 160.94 LBS | RESPIRATION RATE: 18 BRPM | HEART RATE: 95 BPM | BODY MASS INDEX: 24.39 KG/M2

## 2018-07-18 DIAGNOSIS — S52.371D CLOSED GALEAZZI'S FRACTURE OF RIGHT RADIUS WITH ROUTINE HEALING, SUBSEQUENT ENCOUNTER: ICD-10-CM

## 2018-07-18 DIAGNOSIS — Z76.89 RETURN TO WORK EXAM: ICD-10-CM

## 2018-07-18 PROCEDURE — 99283 EMERGENCY DEPT VISIT LOW MDM: CPT

## 2018-07-18 NOTE — DISCHARGE INSTRUCTIONS
Take care to keep you cast clean and dry. Don't life anything in a way that causes ANY pain. It would be better not to lit with your right arm until told you can by orthopedics, but since that it not an option, you need to be VERY careful.     Call today to schedule a follow-up appointment with orthopedics. Remind them that you had surgery with Dr. Tyson on 7/9/18.    Cast Care  Your caregiver has immobilized your injury with a cast. Please keep your extremity elevated for the next 3-4 days as this will help prevent swelling and pressure under your cast. Wiggle your fingers or toes frequently to maintain circulation. Keep your cast clean and dry at all times. Do not put any objects under the cast. Trying to scratch itching areas can cause serious skin problems.   SEEK IMMEDIATE MEDICAL CARE IF:  · You develop increasing pain or pressure under the cast.   · You have numbness or tingling around the injured area.   · You have discolored, cool, painful or very swollen fingers or toes beyond the cast.   · The cast feels too tight or too loose   · You experience unbearable itching inside the cast.   · Your cast gets wet and develops a soft spot or area.   · You notice a foul smell coming from the cast (this could indicate infection).   · You notice any drainage on the cast.   · You develop a fever greater than 101° F   Document Released: 01/25/2006 Document Revised: 03/11/2013 Document Reviewed: 12/16/2009  [employer]® Patient Information ©2013 [employer].

## 2018-07-18 NOTE — ED PROVIDER NOTES
ED Provider Note    Scribed for North Barroso M.D. by Zac Haider. 7/18/2018  1:56 PM    CHIEF COMPLAINT  Chief Complaint   Patient presents with   • Letter for School/Work       HPI  Bolivar Leos is a 28 y.o. male who presents to the Emergency Room requesting for a letter to go to work. Patient sustained a right forearm fracture earlier this month and had a cast placed 1.5 weeks ago. The repair was done by Dr. Tyson on July 9, 2018. 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 He also notes he will be doing some lifting of objects less than 50 lbs. He otherwise does not report any other associated symptoms at this time. He does not report any recent fevers.     He would like the cast removed.  However, he clarifies that the reason he wants the cast removed is because he is worried that he will lose his job if there are any restrictions on his ability to work his new job, which involves driving a forklift, and lifting objects of less than 50 pounds, as above.  I assured him that we could manage his work situation without putting his recently repaired fracture at risk.      REVIEW OF SYSTEMS  See HPI for further details.  E    PAST MEDICAL HISTORY   has a past medical history of Asymptomatic human immunodeficiency virus (HIV) infection status ([facility]).    SOCIAL HISTORY  Social History     Social History Main Topics   • Smoking status: Current Every Day Smoker     Packs/day: 1.00     Years: 16.00   • Smokeless tobacco: Former User   • Alcohol use Yes      Comment: Ocassional   • Drug use: Yes     Types: Methamphetamines   • Sexual activity: Not Currently     Partners: Female       SURGICAL HISTORY   has a past surgical history that includes open reduction; ear middle exploration; and forearm orif (Right, 7/9/2018).    CURRENT MEDICATIONS  No current facility-administered medications on file prior to encounter.      Current Outpatient  "Prescriptions on File Prior to Encounter   Medication Sig Dispense Refill   • efavirenz-emtrictabine-tenofovir (ATRIPLA) 600-200-300 MG per tablet Take 1 Tab by mouth every evening.        ALLERGIES  No Known Allergies      VITAL SIGNS: /73   Pulse (!) 104   Temp 36.9 °C (98.5 °F)   Resp 16   Ht 1.727 m (5' 8\")   Wt 73 kg (160 lb 15 oz)   SpO2 96%   BMI 24.47 kg/m²   Pulse ox interpretation: I interpret this pulse ox as normal.  Constitutional: Alert in no apparent distress.  HENT: No signs of trauma, Bilateral external ears normal, Nose normal.   Eyes: Pupils are equal and reactive, Conjunctiva normal, Non-icteric.   Neck: Normal range of motion, No tenderness, Supple, No stridor.    Cardiovascular: Normal peripheral perfusion  Thorax & Lungs: Unlabored respirations, equal chest expansion, no accessory muscle use  Abdomen: Non-distended  Skin:  No erythema, No rash.   Back: Normal alignment and ROM  Extremities: No gross deformity. Hard cast on right forearm.   Musculoskeletal: Good range of motion in all major joints.   Neurologic: Alert, Normal motor function, No focal deficits noted.   Psychiatric: Affect normal, Judgment normal, Mood normal.       COURSE & MEDICAL DECISION MAKING  The patient's VS, Nurses notes reviewed. (See chart for details)    Obtained and reviewed past medical records which shows patient was recently admitted for orthopaedic surgery and left AMA.     1:56 PM Patient seen and examined at bedside. Patient requests for a letter to go to work.  Obviously, it would be preferable for him to use his right arm only minimally.  We discussed lifting techniques to minimize use of his arm, and I clarified that I agree and understand that working this new job is extremely important to him, since will allow him to get out of the homeless shelter.  I emphasized the importance of minimizing activity to the right arm.  I explained that I would write a note for no work restrictions, since he is " not required to work very heavy weights, and I think he can manage most of it with his left arm, since he is fairly muscular.  He agrees that he will try not to use his right arm, and that he will follow-up with orthopedics.  The patient will be discharged with instructions regarding supportive care and medications. Instructions were given for follow-up with with ortho. Discussed indications for seeking immediate medical attention. He will be given a letter for work. Patient was given the opportunity for questions. The patient understands and agrees.         The patient will return for new or worsening symptoms and is stable at the time of discharge.    The patient is referred to a primary physician for blood pressure management, diabetic screening, and for all other preventative health concerns.      DISPOSITION:  Patient will be discharged home in stable condition.    FOLLOW UP:  Michael Tyson M.D.  [address] [facility] [address]  [phone]    Schedule an appointment as soon as possible for a visit  for follow-up regarding your wrist.    [address] [address]  [phone]  Schedule an appointment as soon as possible for a visit  for primary care.      OUTPATIENT MEDICATIONS:  New Prescriptions    No medications on file         FINAL IMPRESSION  1. Closed Galeazzi's fracture of right radius with routine healing, subsequent encounter    2. Return to work exam         Zac DORMAN (Scribe), am scribing for, and in the presence of, North Barroso M.D..    Electronically signed by: Zac Haider (Little), 7/18/2018    North DORMAN M.D. personally performed the services described in this documentation, as scribed by Zac Haider in my presence, and it is both accurate and complete.    The note accurately reflects work and decisions made by me.  North Barroso  7/18/2018  2:14 PM

## 2018-07-18 NOTE — ED TRIAGE NOTES
"Chief Complaint   Patient presents with   • Letter for School/Work     Pt requesting letter stating he can go back to work. Also requesting cast removal - placed 1.5 weeks ago.    /73   Pulse (!) 104   Temp 36.9 °C (98.5 °F)   Resp 16   Ht 1.727 m (5' 8\")   Wt 73 kg (160 lb 15 oz)   SpO2 96%   BMI 24.47 kg/m²     Pt Informed regarding triage process and verbalized understanding to inform triage tech or RN for any changes in condition.  Placed in lobby.    "

## 2018-07-18 NOTE — ED NOTES
Discharge instructions given to pt including follow up w/orthopaedic doctor or [facility]  For cast follow up. Questions answered by RN. Discharged w/steady gait and stable vitals. Work note provided to pt by erp.

## (undated) DEVICE — KIT ANESTHESIA W/CIRCUIT & 3/LT BAG W/FILTER (20EA/CA)

## (undated) DEVICE — ELECTRODE DUAL RETURN W/ CORD - (50/PK)

## (undated) DEVICE — SUCTION INSTRUMENT YANKAUER BULBOUS TIP W/O VENT (50EA/CA)

## (undated) DEVICE — DRAPE 36X28IN RAD CARM BND BG - (25/CA) O

## (undated) DEVICE — DRILL BIT 2.8MM X 155MM CALIBRATED (8TX2=16)

## (undated) DEVICE — PACK LOWER EXTREMITY - (2/CA)

## (undated) DEVICE — LACTATED RINGERS INJ 1000 ML - (14EA/CA 60CA/PF)

## (undated) DEVICE — SENSOR SPO2 NEO LNCS ADHESIVE (20/BX) SEE USER NOTES

## (undated) DEVICE — SUTURE GENERAL

## (undated) DEVICE — GLOVE BIOGEL INDICATOR SZ 8 SURGICAL PF LTX - (50/BX 4BX/CA)

## (undated) DEVICE — SUTURE 3-0 VICRYL PLUS SH - 8X 18 INCH (12/BX)

## (undated) DEVICE — SET LEADWIRE 5 LEAD BEDSIDE DISPOSABLE ECG (1SET OF 5/EA)

## (undated) DEVICE — CHLORAPREP 26 ML APPLICATOR - ORANGE TINT(25/CA)

## (undated) DEVICE — CANISTER SUCTION 3000ML MECHANICAL FILTER AUTO SHUTOFF MEDI-VAC NONSTERILE LF DISP  (40EA/CA)

## (undated) DEVICE — GLOVE BIOGEL SZ 7.5 SURGICAL PF LTX - (50PR/BX 4BX/CA)

## (undated) DEVICE — DRAPE C-ARM LARGE 41IN X 74 IN - (10/BX 2BX/CA)

## (undated) DEVICE — HEAD HOLDER JUNIOR/ADULT

## (undated) DEVICE — SUTURE 2-0 MONOCRYL CT-1

## (undated) DEVICE — SPLINT PLASTER 5 IN X 30 IN - (50EA/BX 6BX/CA)

## (undated) DEVICE — BANDAGE ELASTIC 4 HONEYCOMB - 4"X5YD LF (20/CA)"

## (undated) DEVICE — PADDING CAST 4 IN STERILE - 4 X 4 YDS (24/CA)

## (undated) DEVICE — SET EXTENSION WITH 2 PORTS (48EA/CA) ***PART #2C8610 IS A SUBSTITUTE*****

## (undated) DEVICE — WATER IRRIG. STER. 1500 ML - (9/CA)

## (undated) DEVICE — SODIUM CHL IRRIGATION 0.9% 1000ML (12EA/CA)

## (undated) DEVICE — SLEEVE, VASO, THIGH, MED

## (undated) DEVICE — NEPTUNE 4 PORT MANIFOLD - (20/PK)

## (undated) DEVICE — TUBING CLEARLINK DUO-VENT - C-FLO (48EA/CA)

## (undated) DEVICE — ELECTRODE 850 FOAM ADHESIVE - HYDROGEL RADIOTRNSPRNT (50/PK)

## (undated) DEVICE — PROTECTOR ULNA NERVE - (36PR/CA)

## (undated) DEVICE — MASK ANESTHESIA ADULT  - (100/CA)

## (undated) DEVICE — GOWN WARMING STANDARD FLEX - (30/CA)

## (undated) DEVICE — KIT ROOM DECONTAMINATION